# Patient Record
Sex: FEMALE | Race: WHITE | NOT HISPANIC OR LATINO | Employment: OTHER | URBAN - METROPOLITAN AREA
[De-identification: names, ages, dates, MRNs, and addresses within clinical notes are randomized per-mention and may not be internally consistent; named-entity substitution may affect disease eponyms.]

---

## 2018-12-21 PROBLEM — E03.9 HYPOTHYROIDISM: Status: ACTIVE | Noted: 2018-12-21

## 2018-12-21 PROBLEM — I48.91 ATRIAL FIBRILLATION WITH RVR: Status: ACTIVE | Noted: 2018-12-21

## 2018-12-21 PROBLEM — I10 PRIMARY HYPERTENSION: Status: ACTIVE | Noted: 2018-12-21

## 2018-12-21 PROBLEM — K21.9 GASTROESOPHAGEAL REFLUX DISEASE WITHOUT ESOPHAGITIS: Status: ACTIVE | Noted: 2018-12-21

## 2019-01-11 PROBLEM — C50.412 CARCINOMA OF BREAST UPPER OUTER QUADRANT, LEFT: Status: ACTIVE | Noted: 2019-01-11

## 2019-03-06 PROBLEM — K80.10 CALCULUS OF GALLBLADDER WITH CHRONIC CHOLECYSTITIS WITHOUT OBSTRUCTION: Status: ACTIVE | Noted: 2019-03-06

## 2019-04-23 PROBLEM — C50.412 MALIGNANT NEOPLASM OF UPPER-OUTER QUADRANT OF LEFT FEMALE BREAST: Status: ACTIVE | Noted: 2019-04-23

## 2019-06-04 ENCOUNTER — CLINICAL SUPPORT (OUTPATIENT)
Dept: REHABILITATION | Facility: HOSPITAL | Age: 69
End: 2019-06-04
Payer: MEDICARE

## 2019-06-04 DIAGNOSIS — M25.511 ACUTE PAIN OF BOTH SHOULDERS: ICD-10-CM

## 2019-06-04 DIAGNOSIS — M25.619 LIMITED RANGE OF MOTION (ROM) OF SHOULDER: ICD-10-CM

## 2019-06-04 DIAGNOSIS — M25.512 ACUTE PAIN OF BOTH SHOULDERS: ICD-10-CM

## 2019-06-04 DIAGNOSIS — Z91.89 AT RISK FOR LYMPHEDEMA: ICD-10-CM

## 2019-06-04 PROCEDURE — 97140 MANUAL THERAPY 1/> REGIONS: CPT | Mod: PN | Performed by: PHYSICAL THERAPIST

## 2019-06-04 PROCEDURE — 97161 PT EVAL LOW COMPLEX 20 MIN: CPT | Mod: PN | Performed by: PHYSICAL THERAPIST

## 2019-06-04 NOTE — PATIENT INSTRUCTIONS
[facility]  Created by dora elkins Jan 23rd, 2018      Total 3      Wall Wax Off    Stand facing a wall, elbows bent and tucked in to your side. With the band looped around your hands, place hands flat against the wall. Keep shoulders down and back and the shoulder blades engaged.    Keeping your elbows tucked into your side, move Right hand in a clockwise Native and return to center, repteat the same motion for the Left side.  Repeat 20 Times     Complete 1 Set     Perform 2 Time(s) a Day         copyright

## 2019-06-04 NOTE — PROGRESS NOTES
[facility]  Physical Therapy Initial Evaluation    Name: Darshana Lopez  Clinic Number: [id number]    Therapy Diagnosis:   Encounter Diagnoses   Name Primary?   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  T1c(1.6cm), T2(2.4cm)N1(1/5)M0       History   History of Present Illness: Darshana is a 69 y.o. female that presents to  [facility] s/p L breast surgery. Pt underwent B mastectomy on 4/23/19 with Dr. Davidson. Pt will have radiation treatment in upcoming future. Pt reports decreased ROM in the L/R UE. 5 llnodes removed on L. Pt has had gallbladder surgery on 3/6/19 so she is fatigued due to multiple surgeries. Reports chest wall anteriorly is healed but feels very tight with UE elevation. Pt does not plan on reconstruction in future.   Chief complaint: decreased ROM L UE  Surgical History:  Darshana Lopez  has a past surgical history that includes [place] lymph node biopsy (Left, 1/11/2019); Breast lumpectomy (Left); Laparoscopic cholecystectomy (N/A, 3/6/2019); Simple mastectomy (Bilateral, 4/23/2019); and Axillary node dissection (Left, 4/23/2019).    Chemotherapy: none  Radiation: in near future     Past Medical History:   Diagnosis Date    Anticoagulant long-term use     Arthritis     Atrial fibrillation     Breast cancer     left    Cancer     Cholecystitis     Claustrophobia      Edema of extremities     GERD (gastroesophageal reflux disease)     History of bronchitis     Hypertension     Obesity, morbid, BMI 50 or higher     PONV (postoperative nausea and vomiting) 01/2011    lumpectomy and node bx (did not get sick with [facility]cyst[facility])    Thyroid disease     Vertigo           Medications:  Darshana has a current medication list which includes the following prescription(s): apixaban, benazepril, bumetanide, dicyclomine, hydrocodone-acetaminophen, levothyroxine, magnesium oxide, meclizine, pantoprazole, potassium chloride, and sotalol.    Allergies:  Review of patient's allergies indicates:   Allergen Reactions    Adhesive Other (See Comments)     Pulls skin, redness.    Dilaudid [hydromorphone (bulk)] Nausea And Vomiting    Iodine and iodide containing products Rash        Prior Therapy: none  Social History: pt lives with their spouse  Occupation: retired cosmotologist , had some shoulder limitation as well as cervical ROM loss  Prior Level of Function: I with ADL, difficulty with overhead reaching due to OA of shoulders, decreased neck ROM  Current Level of Function: pt reji to use her arms for light adl    Patient's Goals: be able to use my arms again    Hand dominance: r    Subjective   Pt states: she is in pain in the L chest wall mainly tightness   Pain: 6/10 on VAS currently.   Best: 4/10  Worst: 6/10   Pain location: left UE   Objective   Mental status :oriented    Postural examination/scapula alignment: Rounded shoulder, forward head,     Skin Integrity:   Scar Location: B chest wall sternum to axilla L/R  Appearance: dry, healed, no redness  Signs of infection: No    Edema:see measurements below  Location: L UE    Axillary Web Syndrome/Cording: none    Sensation: WNL B UEs        Range of Motion:      Shoulder Range of Motion:   Active /Passive ROM Right Left   Flexion 135 135   Abduction 135 135   Extension 10 10   IR/90deg 70 70   ER/90deg 45 60         "  Strength: manual muscle test grades below   Upper Extremity Strength   (R) UE (L) UE   Shoulder flexion: 2+/5 2+/5   Shoulder Abduction: 2+/5 2+/5   Shoulder IR 3/5 3/5   Shoulder ER 3/5 3/5   Elbow flexion: 3/5 3/5   Elbow extension: 3/5 3/5   Wrist flexion: 3+/5 3+/5   Wrist extension: 3+/5 3+/5     Baseline Measurements of BL UE's for early detection of Lymphedema:     LANDMARK RIGHT UE LEFT UE DIFFERENCE   E + 8" 50 cm 49 cm 1 cm   E + 6" 49 cm 47 cm 2 cm   E + 4" 47 cm 46 cm 1 cm   E + 2" 41 cm 44 cm 1 cm   Elbow 36 cm 39 cm 3 cm   W+ 8" 36 cm 37.5 cm .5 cm   W +  6" 34 cm 35.5 cm 1.5 cm   W + 4" 32 cm 31 cm 1 cm   Wrist 18.5 cm 19.5 cm 1 cm   DPC 21   cm 21 cm 0 cm   IP Thumb 8 cm 7.5 cm .5 cm     Functional Mobility (Bed mobility, transfers)  I    ADL's:  I with light ADL    Gait Assessment:   - AD used: none  - Assistance: independent  - Distance: community distances     Patient Education Provided   - role of therapy in multi - disciplinary team, goals for therapy  - Pt was educated in lymphedema etiology and management plans.    - Pt was provided with written risk reductions and precautions for managing lymphedema.     Pt has no cultural, educational or language barriers to learning provided.  Treatment and Instruction of Home Exercise Program      Darshana received the following manual lymphatic massage to cervical terminus, R axilla, trunk corresponding anastamoses and L UE to promote lymphatic drainage, myofascial stretching  To L chest wall were performed to increased myofascial/soft tissue length, mobility and pliability, increase PROM, AROM of L UE and function as well as to decrease pain for 20 minutes. 4/10 pain level after treatment    Written Home Exercises Provided and Patient Education: Handouts given   See EMR under patient instructions for program given  Pt demonstrated good understanding of the education provided. Patient demo good return demo of skill of exercises.    Functional Limitations " Reporting        Assessment   This is a 69 y.o. female referred to outpatient physical therapy and presents with a medical diagnosis of B mastectomy/ breast cancer and was seen today post-operatively to establish PT plan of care for impairments following surgery including: MLD, ROM manual therapy lymphedema education and risk reduction practices, HEP and therapeutic exercise.     Pt instructed in HEP this session and was able to perform all exercises given independently. Pt instructed to follow up with therapist if any concerns arise with program established. Pt will continue to benefit from skilled physical therapy to address the impairments stated in chart below, provide patient/family education and to maximize pt's level of independence in home and community environment     Anticipated barriers to physical therapy: none     Pt's spiritual, cultural and educational needs considered and pt agreeable to plan of care and goals as stated below:     Medical necessity is demonstrated by the following IMPAIRMENTS/PROMBLEM LIST:    History  Co-morbidities and personal factors that may impact the plan of care Co-morbidities:   prior abdominal surgery    Personal Factors:   no deficits     low   Examination  Body Structures and Functions, activity limitations and participation restrictions that may impact the plan of care Body Regions:   upper extremities    Body Systems:    ROM    Participation Restrictions:   B mastectomy    Activity limitations:   Learning and applying knowledge  no deficits    General Tasks and Commands  no deficits    Communication  no deficits    Mobility  lifting and carrying objects    Self care  dressing    Domestic Life  doing house work (cleaning house, washing dishes, laundry)    Interactions/Relationships  no deficits    Life Areas  no deficits    Community and Social Life  community life         low   Clinical Presentation stable and uncomplicated low   Decision Making/ Complexity Score: low        Goals: Pt agrees with goals set    Short Term goals: 4 weeks  1. Patient will demonstrate 100% understanding of lymphedema risk reduction practices to include self monitoring for lymphedema. (progressing, not met)  2. Patient will demonstrate independence with Home Exercise program established. (progressing, not met)  3. Pt will increase AROM/PROM in shoulder abduction ROM to 145 degrees bilaterally to improve functional reach, carry, push, pull pain free. (progressing, not met)  4. Pt will increase AROM/PROM in shoulder flexion to 145 degrees bilaterally to improve functional reach, carry, push, pull pain free.(progressing, not met)  5. Strength will be assessed and appropriate goals set. (progressing, not met)    Long Term Goals: 8 weeks   1.  Pt will increase AROM/PROM in shoulder flexion to 160 degrees bilaterally to improve functional reach, carry, push, pull pain free. (progressing, not met)  2. Pt will increase strength to 4+/5 in gross UE musculature to improve tolerance to all functional activities pain free. (progressing, not met)  3. Pt will demonstrate full/maximized tissue mobility to increase ROM and promote healthy tissue to be pain free at discharge. (progressing, not met)  4. Pt will report decrease in overall worst pain to 2/10 at discharge. (progressing, not met)  5. Pt will increase AROM/PROM in shoulder abduction ROM to 160 degrees bilaterally to improve functional reach, carry, push, pull pain free. (progressing, not met)  6. Patient will report compliance with walking program 5x week for 10 min each day to improve overall cardiovascular function and decrease cancer related fatigue at discharge. (progressing, not met)      Plan   Outpatient physical therapy 2x week for 8 weeks to include the following:   Manual Therapy., MLD, therapeutic ex, HEP. May discontinue in deference to radiation therapy, at Dr. Jain discretion      Therapist: Clementine Gregorio, PT , CLT  6/4/2019

## 2019-06-05 PROBLEM — M25.511 ACUTE PAIN OF BOTH SHOULDERS: Status: ACTIVE | Noted: 2019-06-05

## 2019-06-05 PROBLEM — Z91.89 AT RISK FOR LYMPHEDEMA: Status: ACTIVE | Noted: 2019-06-05

## 2019-06-05 PROBLEM — M25.512 ACUTE PAIN OF BOTH SHOULDERS: Status: ACTIVE | Noted: 2019-06-05

## 2019-06-05 PROBLEM — M25.619 LIMITED RANGE OF MOTION (ROM) OF SHOULDER: Status: ACTIVE | Noted: 2019-06-05

## 2019-06-07 ENCOUNTER — CLINICAL SUPPORT (OUTPATIENT)
Dept: REHABILITATION | Facility: HOSPITAL | Age: 69
End: 2019-06-07
Payer: MEDICARE

## 2019-06-07 DIAGNOSIS — Z91.89 AT RISK FOR LYMPHEDEMA: ICD-10-CM

## 2019-06-07 DIAGNOSIS — M25.619 LIMITED RANGE OF MOTION (ROM) OF SHOULDER: ICD-10-CM

## 2019-06-07 DIAGNOSIS — M25.512 ACUTE PAIN OF BOTH SHOULDERS: ICD-10-CM

## 2019-06-07 DIAGNOSIS — M25.511 ACUTE PAIN OF BOTH SHOULDERS: ICD-10-CM

## 2019-06-07 PROCEDURE — 97140 MANUAL THERAPY 1/> REGIONS: CPT | Mod: PN | Performed by: PHYSICAL THERAPIST

## 2019-06-07 PROCEDURE — 97110 THERAPEUTIC EXERCISES: CPT | Mod: PN | Performed by: PHYSICAL THERAPIST

## 2019-06-07 NOTE — PATIENT INSTRUCTIONS
Exaggerated Breathing and Relaxation      Practice deep breathing frequently in the first few days following surgery even before you begin exercising your arm. Inhale slowly and deeply through the nose and exhale through pursed lips. Concentrate on relaxing as you let the air out of your lungs. Repeat three (3) to four (4) times, remembering to breath in deeply and then relaxing. This exercise helps to ease the sensation of pulling and discomfort that may be experienced while exercising.      Ball Squeeze    Perform this exercise three (3) times a day for three (3) to four (4) minutes each time.    The ball squeeze helps to prevent or reduce temporary swelling that may occur in the affected arm. This exercise may be performed standing, sitting or while lying in bed. During this exercise the affected arm should be slightly bent and held upward. Support your arm with a pillow if you are uncomfortable holding it up.    a. Hold a rubber ball in your hand on the affected side and lift your arm upward.  b. Alternate squeezing and relaxing the ball.          Wall Climb    Perform this exercise three (2) times a day with ten (10) repetitions.    Stand and FACE THE WALL with your toes 10 to 12 inches away from the wall.    a. Place the fingers of your affected arm on the wall and slowly walk your fingers up the wall. Let your fingers climb the wall as high as possible without feeling pulling or pain.  b. Hold this stretch for 15 to 20 seconds then move your fingers back down the wall.  c. Try to go a little higher each time. It may relax you a bit if you rest your head against the wall.    Repeat the above exercises standing with your side to the wall.        Thoracic Standing Vertebral Stretch    With feet shoulder width apart, extend arms overhead as high as possible. Stretch right arm further than left, then left arm further than right. Elongate spine and move shoulder blades further.  Repeat as tolerated. Do 1-2  times a day.         Lumbar Rotation    Put your arms out to the side - 90 degrees - your arms should be in a T formation.   Feet on floor, slowly rock knees from side to side in small, pain-free range of motion. Allow lower back to rotate slightly, but keep shoulders flat on ground. Hold 15 seconds. Repeat each side. Repeat 5 times per side. Do 1-2 sessions per day.        Gentle Lower trunk rotation with butterfly stretch - only bring legs 1/2 the distance of the picture   3 x 10 reps each side

## 2019-06-07 NOTE — PROGRESS NOTES
"                                                    Physical Therapy Daily Note     Name: Darshana Lopez  Clinic Number: [id number]  Diagnosis:   Encounter Diagnoses   Name Primary?    Limited range of motion (ROM) of shoulder     Acute pain of both shoulders     At risk for lymphedema      Physician: Shelley Fontenot *  Precautions: lymphedema  Visit #: 2 of 12  PTA Visit #: 0  Time In: 12:15(pt in traffic)  Time Out: 1pm  Total Treatment Time 1:1: 45 min    Evaluation Date: 6/4/2019  Authorization Period Expiration: 5/29/19-12/31/19  Plan of Care Certification Period: 8/4/19  Visit # / Visits authorized: 2/ 12  MD referral: yes    Subjective     Pt reports: she tolerated the last visit well, "I felt so much better after the last visit, able to move my arms better and less neck pain as well". Pt reports she has redness under B lateral axillary folds, has been placing antifungal cream on and it is improving. Has placed a call into Dr. Davidson's office.   Pain Scale: Darshana rates pain on a scale of 0-10 to be 3 currently.    Objective   Redness noted locally at B lateral axillary folds   Darshana received individual therapeutic exercises to develop ROM for 15 minutes including:(pt instructed to perform all exercises with respect to stretching only until gentle pull is felt, avoid fatigue in L UE)  phisioball rollout ex 2 x15 reps for gentle stretch into thoracic extension/UE elevation  Wall circles 2 x 15 reps with towels    Darshana received the following manual therapy techniques: Manual Lymphatic Drainage to cervical terminus/ R axilla, trunk , corresponding anastamoses and L UE for 20 minutes to promote lymphatic flow. Pt received myofascial stretching to anterior chest wall L/R as well as cervical pvms secondary to pain/tightness, scar mobility performed to anterior chest incisions, performed gently.     Written Home Exercises Provided: yes  Pt demo good understanding of the education provided. Darshana demonstrated " good return demonstration of activities.     Education provided re: report to ER if redness persists or spreads   Darshana verbalized good understanding of education provided.   No spiritual or educational barriers to learning provided    Assessment     Patient tolerated treatment well , outlined and demonstrated HEP to pt with instructions to perform until gentle stretch is felt as well as pay attention to fatigue in B UEs. Good progress with treatment today wiith decreasing pain levels  This is a 69 y.o. female referred to outpatient physical therapy and presents with a medical diagnosis of B UEs  and demonstrates limitations as described in the problem list. Pt prognosis is Good. Pt will continue to benefit from skilled outpatient physical therapy to address the deficits listed in the problem list, provide pt/family education and to maximize pt's level of independence in the home and community environment.     Goals as follows:  Short Term goals: 4 weeks  1. Patient will demonstrate 100% understanding of lymphedema risk reduction practices to include self monitoring for lymphedema. (progressing, not met)  2. Patient will demonstrate independence with Home Exercise program established. (progressing, not met)  3. Pt will increase AROM/PROM in shoulder abduction ROM to 145 degrees bilaterally to improve functional reach, carry, push, pull pain free. (progressing, not met)  4. Pt will increase AROM/PROM in shoulder flexion to 145 degrees bilaterally to improve functional reach, carry, push, pull pain free.(progressing, not met)  5. Strength will be assessed and appropriate goals set. (progressing, not met)     Long Term Goals: 8 weeks   1.  Pt will increase AROM/PROM in shoulder flexion to 160 degrees bilaterally to improve functional reach, carry, push, pull pain free. (progressing, not met)  2. Pt will increase strength to 4+/5 in gross UE musculature to improve tolerance to all functional activities pain free.  (progressing, not met)  3. Pt will demonstrate full/maximized tissue mobility to increase ROM and promote healthy tissue to be pain free at discharge. (progressing, not met)  4. Pt will report decrease in overall worst pain to 2/10 at discharge. (progressing, not met)  5. Pt will increase AROM/PROM in shoulder abduction ROM to 160 degrees bilaterally to improve functional reach, carry, push, pull pain free. (progressing, not met)  6. Patient will report compliance with walking program 5x week for 10 min each day to improve overall cardiovascular function and decrease cancer related fatigue at discharge. (progressing, not met)     Plan     Continue with established Plan of Care towards PT goals.    Clementine Gregorio, PT  6/7/2019

## 2019-06-10 ENCOUNTER — CLINICAL SUPPORT (OUTPATIENT)
Dept: REHABILITATION | Facility: HOSPITAL | Age: 69
End: 2019-06-10
Payer: MEDICARE

## 2019-06-10 DIAGNOSIS — M25.512 ACUTE PAIN OF BOTH SHOULDERS: ICD-10-CM

## 2019-06-10 DIAGNOSIS — M25.619 LIMITED RANGE OF MOTION (ROM) OF SHOULDER: ICD-10-CM

## 2019-06-10 DIAGNOSIS — Z91.89 AT RISK FOR LYMPHEDEMA: ICD-10-CM

## 2019-06-10 DIAGNOSIS — M25.511 ACUTE PAIN OF BOTH SHOULDERS: ICD-10-CM

## 2019-06-10 PROCEDURE — 97110 THERAPEUTIC EXERCISES: CPT | Mod: PN | Performed by: PHYSICAL THERAPIST

## 2019-06-10 PROCEDURE — 97140 MANUAL THERAPY 1/> REGIONS: CPT | Mod: PN | Performed by: PHYSICAL THERAPIST

## 2019-06-10 NOTE — PROGRESS NOTES
"                                                    Physical Therapy Daily Note     Name: Darshana Lopez  Clinic Number: [id number]  Diagnosis:   Encounter Diagnoses   Name Primary?    Limited range of motion (ROM) of shoulder     Acute pain of both shoulders     At risk for lymphedema      Physician: Shelley Fontenot *  Precautions: lymphedema  Visit #: 3 of 12  PTA Visit #: 0  Time In: 11:12(pt in traffic)  Time Out: 12pm  Total Treatment Time 1:1: 45 min    Evaluation Date: 6/4/2019  Authorization Period Expiration: 5/29/19-12/31/19  Plan of Care Certification Period: 8/4/19  Visit # / Visits authorized: 3/ 12  MD referral: yes    Subjective     Pt reports: she is able to fix her hair now and use the L UE more. I haven't had nearly as much pain since I started therapy.". + radicular sxs lateral 3 fingers on the L. "I hope my arm is going up enough for radiation treatments". Pt stated she wants to discuss radiation schedule with Dr. Jain before scheduling additional appointments for next week.     Pain Scale: Darshana rates pain on a scale of 0-10 to be 3 currently.    Objective   Redness noted locally at B lateral axillary folds pt reports she is going to go by Dr. Davidson's office after visit today secondary to skin irritation especially under the R axillary fold.   Darshana received individual therapeutic exercises to develop ROM for 15 minutes including:(pt instructed to perform all exercises with respect to stretching only until gentle pull is felt, avoid fatigue in L UE)  phisioball rollout ex 2 x15 reps for gentle stretch into thoracic extension/UE elevation  Wall circles 2 x 15 reps with towels  Wall slides to tolerance B x 15 reps with towels    Darshana received the following manual therapy techniques: Manual Lymphatic Drainage to cervical terminus/ R axilla, trunk , corresponding anastamoses and L UE for 30 minutes to promote lymphatic flow. Pt received myofascial stretching to anterior chest wall L/R as " well as cervical pvms secondary to pain/tightness, scar mobility performed to anterior chest incisions, performed gently.     Shoulder Range of Motion:   Active /Passive ROM Right Left   Flexion 150 145   Abduction 150 145   Extension 10 10   IR/90deg 70 70   ER/90deg 50 60      Written Home Exercises Provided: yes  Pt demo good understanding of the education provided. Darshana demonstrated good return demonstration of activities.     Education provided re: report to ER if redness persists or spreads   Darshana verbalized good understanding of education provided.   No spiritual or educational barriers to learning provided    Assessment     Patient tolerated treatment well  Improved functional use in B UEs, decreased pain B UEs and improved ROM B UEs  This is a 69 y.o. female referred to outpatient physical therapy and presents with a medical diagnosis of B UEs  and demonstrates limitations as described in the problem list. Pt prognosis is Good. Pt will continue to benefit from skilled outpatient physical therapy to address the deficits listed in the problem list, provide pt/family education and to maximize pt's level of independence in the home and community environment.     Goals as follows:  Short Term goals: 4 weeks  1. Patient will demonstrate 100% understanding of lymphedema risk reduction practices to include self monitoring for lymphedema. (progressing, not met)  2. Patient will demonstrate independence with Home Exercise program established. (progressing, not met)  3. Pt will increase AROM/PROM in shoulder abduction ROM to 145 degrees bilaterally to improve functional reach, carry, push, pull pain free. (progressing, not met)  4. Pt will increase AROM/PROM in shoulder flexion to 145 degrees bilaterally to improve functional reach, carry, push, pull pain free.(progressing, not met)  5. Strength will be assessed and appropriate goals set. (progressing, not met)     Long Term Goals: 8 weeks   1.  Pt will increase  AROM/PROM in shoulder flexion to 160 degrees bilaterally to improve functional reach, carry, push, pull pain free. (progressing, not met)  2. Pt will increase strength to 4+/5 in gross UE musculature to improve tolerance to all functional activities pain free. (progressing, not met)  3. Pt will demonstrate full/maximized tissue mobility to increase ROM and promote healthy tissue to be pain free at discharge. (progressing, not met)  4. Pt will report decrease in overall worst pain to 2/10 at discharge. (progressing, not met)  5. Pt will increase AROM/PROM in shoulder abduction ROM to 160 degrees bilaterally to improve functional reach, carry, push, pull pain free. (progressing, not met)  6. Patient will report compliance with walking program 5x week for 10 min each day to improve overall cardiovascular function and decrease cancer related fatigue at discharge. (progressing, not met)     Plan     Continue with established Plan of Care towards PT goals.    Clementine Gregorio, PT  6/10/2019

## 2019-06-12 ENCOUNTER — CLINICAL SUPPORT (OUTPATIENT)
Dept: REHABILITATION | Facility: HOSPITAL | Age: 69
End: 2019-06-12
Payer: MEDICARE

## 2019-06-12 DIAGNOSIS — M25.511 ACUTE PAIN OF BOTH SHOULDERS: ICD-10-CM

## 2019-06-12 DIAGNOSIS — M25.619 LIMITED RANGE OF MOTION (ROM) OF SHOULDER: ICD-10-CM

## 2019-06-12 DIAGNOSIS — Z91.89 AT RISK FOR LYMPHEDEMA: ICD-10-CM

## 2019-06-12 DIAGNOSIS — M25.512 ACUTE PAIN OF BOTH SHOULDERS: ICD-10-CM

## 2019-06-12 PROCEDURE — 97110 THERAPEUTIC EXERCISES: CPT | Mod: PN

## 2019-06-12 PROCEDURE — 97140 MANUAL THERAPY 1/> REGIONS: CPT | Mod: PN

## 2019-06-12 NOTE — PROGRESS NOTES
Physical Therapy Daily Note     Name: Darshana Lopez  Clinic Number: [id number]  Diagnosis:   Encounter Diagnoses   Name Primary?    Limited range of motion (ROM) of shoulder     Acute pain of both shoulders     At risk for lymphedema      Physician: Shelley Fontenot *  Precautions: lymphedema  Visit #: 4 of 12  PTA Visit #: 0  Time In: 11:07  Time Out: 12:05pm  Total Treatment Time 1:1: 55 min    Evaluation Date: 6/4/2019  Authorization Period Expiration: 5/29/19-12/31/19  Plan of Care Certification Period: 8/4/19  Visit # / Visits authorized: 4/ 12  MD referral: yes    Subjective     Pt reports: she has seen great results with therapy with increased range of motion. She is interested in continuing with therapy while undergoing radiation.     Pain Scale: Darshana rates pain on a scale of 0-10 to be 2 currently.    Objective   Redness noted locally at B lateral axillary folds which patient reports has improved and she is using fungal cream.    Darshana received individual therapeutic exercises to develop ROM for 15 minutes including:(pt instructed to perform all exercises with respect to stretching only until gentle pull is felt, avoid fatigue in L UE)  phisioball rollout ex 2 x15 reps for gentle stretch into thoracic extension/UE elevation  Wall circles 2 x 15 reps with towels  Wall slides to tolerance B x 15 reps with towels  Supine cervical retraction with thick towel roll and and pillow     Darshana received the following manual therapy techniques: Pt received myofascial stretching to anterior chest wall L/R as well as cervical pvms secondary to pain/tightness, scar mobility performed to anterior chest incisions, performed gently pec muscle bending B. The above performed for a total of 40 minutes.     Written Home Exercises Provided: cervical retraction.   Pt demo good understanding of the education provided. Darshana demonstrated good return demonstration of  activities.     Education provided re: report to ER if redness persists or spreads   Darshana verbalized good understanding of education provided.   No spiritual or educational barriers to learning provided    Assessment     Patient presents with improved soft tissue mobility post treatment. She presents with increased tightness to cervical extensors which improve with manual techniques and gentle cervical retraction.   This is a 69 y.o. female referred to outpatient physical therapy and presents with a medical diagnosis of B UEs  and demonstrates limitations as described in the problem list. Pt prognosis is Good. Pt will continue to benefit from skilled outpatient physical therapy to address the deficits listed in the problem list, provide pt/family education and to maximize pt's level of independence in the home and community environment.     Goals as follows:  Short Term goals: 4 weeks  1. Patient will demonstrate 100% understanding of lymphedema risk reduction practices to include self monitoring for lymphedema. (progressing, not met)  2. Patient will demonstrate independence with Home Exercise program established. (progressing, not met)  3. Pt will increase AROM/PROM in shoulder abduction ROM to 145 degrees bilaterally to improve functional reach, carry, push, pull pain free. (progressing, not met)  4. Pt will increase AROM/PROM in shoulder flexion to 145 degrees bilaterally to improve functional reach, carry, push, pull pain free.(progressing, not met)  5. Strength will be assessed and appropriate goals set. (progressing, not met)     Long Term Goals: 8 weeks   1.  Pt will increase AROM/PROM in shoulder flexion to 160 degrees bilaterally to improve functional reach, carry, push, pull pain free. (progressing, not met)  2. Pt will increase strength to 4+/5 in gross UE musculature to improve tolerance to all functional activities pain free. (progressing, not met)  3. Pt will demonstrate full/maximized tissue mobility  to increase ROM and promote healthy tissue to be pain free at discharge. (progressing, not met)  4. Pt will report decrease in overall worst pain to 2/10 at discharge. (progressing, not met)  5. Pt will increase AROM/PROM in shoulder abduction ROM to 160 degrees bilaterally to improve functional reach, carry, push, pull pain free. (progressing, not met)  6. Patient will report compliance with walking program 5x week for 10 min each day to improve overall cardiovascular function and decrease cancer related fatigue at discharge. (progressing, not met)     Plan     Continue with established Plan of Care towards PT goals.    Selvin Salazar, PT  6/12/2019

## 2019-06-14 ENCOUNTER — CLINICAL SUPPORT (OUTPATIENT)
Dept: REHABILITATION | Facility: HOSPITAL | Age: 69
End: 2019-06-14
Payer: MEDICARE

## 2019-06-14 DIAGNOSIS — M25.619 LIMITED RANGE OF MOTION (ROM) OF SHOULDER: ICD-10-CM

## 2019-06-14 DIAGNOSIS — Z91.89 AT RISK FOR LYMPHEDEMA: ICD-10-CM

## 2019-06-14 DIAGNOSIS — M25.511 ACUTE PAIN OF BOTH SHOULDERS: ICD-10-CM

## 2019-06-14 DIAGNOSIS — M25.512 ACUTE PAIN OF BOTH SHOULDERS: ICD-10-CM

## 2019-06-14 PROCEDURE — 97110 THERAPEUTIC EXERCISES: CPT | Mod: PN | Performed by: PHYSICAL THERAPIST

## 2019-06-14 PROCEDURE — 97140 MANUAL THERAPY 1/> REGIONS: CPT | Mod: PN | Performed by: PHYSICAL THERAPIST

## 2019-06-14 NOTE — PROGRESS NOTES
Physical Therapy Daily Note     Name: Darshana Lopez  Clinic Number: [id number]  Diagnosis:   Encounter Diagnoses   Name Primary?    Limited range of motion (ROM) of shoulder     Acute pain of both shoulders     At risk for lymphedema      Physician: Shelley Fontenot *  Precautions: lymphedema  Visit #: 5 of 12  PTA Visit #: 0  Time In: 9:00 am  Time Out: 10 am  Total Treatment Time 1:1: 55 min    Evaluation Date: 6/4/2019  Authorization Period Expiration: 5/29/19-12/31/19  Plan of Care Certification Period: 8/4/19  Visit # / Visits authorized: 5/ 12  MD referral: yes    Subjective     Pt reports: she is doing much better since beginning therapy, I can reach up so much better over my head. Sees Dr. Davidson next week for f/u. Still has reddened area under the axillary folds. Dr. Davidson's office called in a cream but doesn't seem to be working yet per pt.  Saw her cardiologist and received a great report. Still having trouble with her stomach , starting a new med . Will see Dr. Jain next week to begin radiation soon. Pt will get clarification from him regarding attending therapy or placing therapy on hold.     Pain Scale: Darshana rates pain on a scale of 0-10 to be 4 currently.under the L axilla/chest wall    Objective   Redness noted locally at B lateral axillary folds which patient reports has improved and she is using fungal cream.    Darshana received individual therapeutic exercises to develop ROM for 15 minutes including:(pt instructed to perform all exercises with respect to stretching only until gentle pull is felt, avoid fatigue in L UE)  phisioball rollout ex 2 x15 reps for gentle stretch into thoracic extension/UE elevation  Wall circles 2 x 15 reps with towels  Wall slides to tolerance B x 15 reps with towels  Supine cervical retraction with thick towel roll and and pillow \  Pec major stretch in doorway 3 sec hold to 90 degrees elevation(gentle  stretch cueing) x 5 reps L/R    Darshana received the following manual therapy techniques: Pt received myofascial stretching to anterior chest wall L/R as well as cervical pvms secondary to pain/tightness, scar mobility performed to anterior chest incisions, performed gently pec muscle stretching via towel roll under pt's head and had pt retract her shoulders toward the mat x 5 reps. The above performed for a total of 45 minutes.     Written Home Exercises Provided: cervical retraction with shoulder retraction towel roll under her head systematically lowering her head , added pec major stretch in doorway with trunk rotation to opposite side, handout provided, pt encouraged to do gentle stretch only.   Pt demo good understanding of the education provided. Darshana demonstrated good return demonstration of activities.     Education provided re: report to ER if redness persists or spreads   Darshana verbalized good understanding of education provided.   No spiritual or educational barriers to learning provided    Assessment     Patient presents with good functional mobility in her UEs, able to reach up into cabinets more easily with B UEs, pt to call if she is to continue after f/u with Dr. Jain. Added pec stretch today to improve ROM, encouraged lowering of head as tolerated due to cervical extensor tightness.   This is a 69 y.o. female referred to outpatient physical therapy and presents with a medical diagnosis of B UEs  and demonstrates limitations as described in the problem list. Pt prognosis is Good. Pt will continue to benefit from skilled outpatient physical therapy to address the deficits listed in the problem list, provide pt/family education and to maximize pt's level of independence in the home and community environment.     Goals as follows:  Short Term goals: 4 weeks  1. Patient will demonstrate 100% understanding of lymphedema risk reduction practices to include self monitoring for lymphedema. (progressing, not  met)  2. Patient will demonstrate independence with Home Exercise program established. (progressing, not met)  3. Pt will increase AROM/PROM in shoulder abduction ROM to 145 degrees bilaterally to improve functional reach, carry, push, pull pain free. (progressing, not met)  4. Pt will increase AROM/PROM in shoulder flexion to 145 degrees bilaterally to improve functional reach, carry, push, pull pain free.(progressing, not met)  5. Strength will be assessed and appropriate goals set. (progressing, not met)     Long Term Goals: 8 weeks   1.  Pt will increase AROM/PROM in shoulder flexion to 160 degrees bilaterally to improve functional reach, carry, push, pull pain free. (progressing, not met)  2. Pt will increase strength to 4+/5 in gross UE musculature to improve tolerance to all functional activities pain free. (progressing, not met)  3. Pt will demonstrate full/maximized tissue mobility to increase ROM and promote healthy tissue to be pain free at discharge. (progressing, not met)  4. Pt will report decrease in overall worst pain to 2/10 at discharge. (progressing, not met)  5. Pt will increase AROM/PROM in shoulder abduction ROM to 160 degrees bilaterally to improve functional reach, carry, push, pull pain free. (progressing, not met)  6. Patient will report compliance with walking program 5x week for 10 min each day to improve overall cardiovascular function and decrease cancer related fatigue at discharge. (progressing, not met)     Plan     Continue with established Plan of Care towards PT goals.    Clementine Gregorio, PT  6/14/2019

## 2019-06-29 PROBLEM — A41.9 SEVERE SEPSIS: Status: ACTIVE | Noted: 2019-06-29

## 2019-06-29 PROBLEM — J90 PLEURAL EFFUSION: Status: ACTIVE | Noted: 2019-06-29

## 2019-06-29 PROBLEM — I31.39 PERICARDIAL EFFUSION: Status: ACTIVE | Noted: 2019-06-29

## 2019-06-29 PROBLEM — R65.20 SEVERE SEPSIS: Status: ACTIVE | Noted: 2019-06-29

## 2019-06-29 PROBLEM — R79.89 ELEVATED TROPONIN: Status: ACTIVE | Noted: 2019-06-29

## 2019-06-29 PROBLEM — N17.9 AKI (ACUTE KIDNEY INJURY): Status: ACTIVE | Noted: 2019-06-29

## 2019-07-01 PROBLEM — A40.1: Status: ACTIVE | Noted: 2019-07-01

## 2019-07-12 PROBLEM — Z75.8 DISCHARGE PLANNING ISSUES: Status: ACTIVE | Noted: 2019-07-12

## 2019-07-20 PROBLEM — I48.20 CHRONIC ATRIAL FIBRILLATION: Status: ACTIVE | Noted: 2019-07-20

## 2019-07-20 PROBLEM — I50.23 ACUTE ON CHRONIC SYSTOLIC CONGESTIVE HEART FAILURE: Status: ACTIVE | Noted: 2019-07-20

## 2019-07-20 PROBLEM — R06.02 SOB (SHORTNESS OF BREATH): Status: ACTIVE | Noted: 2019-07-20

## 2019-07-20 PROBLEM — E66.9 OBESITY: Status: ACTIVE | Noted: 2019-07-20

## 2019-08-01 PROBLEM — I50.9 CHF (CONGESTIVE HEART FAILURE), NYHA CLASS III: Status: ACTIVE | Noted: 2019-08-01

## 2019-08-02 PROBLEM — G93.32 CHRONIC FATIGUE SYNDROME: Status: ACTIVE | Noted: 2019-08-02

## 2019-08-13 PROBLEM — I87.2 CHRONIC VENOUS STASIS DERMATITIS OF BOTH LOWER EXTREMITIES: Status: ACTIVE | Noted: 2019-08-13

## 2019-08-13 PROBLEM — I50.33 ACUTE ON CHRONIC DIASTOLIC HF (HEART FAILURE): Status: ACTIVE | Noted: 2019-08-13

## 2019-08-13 PROBLEM — R06.02 SHORTNESS OF BREATH: Status: ACTIVE | Noted: 2019-08-13

## 2019-08-13 PROBLEM — I48.0 PAROXYSMAL ATRIAL FIBRILLATION: Status: ACTIVE | Noted: 2019-08-13

## 2019-08-16 PROBLEM — E87.6 HYPOKALEMIA: Status: ACTIVE | Noted: 2019-08-16

## 2019-08-19 PROBLEM — J96.01 ACUTE RESPIRATORY FAILURE WITH HYPOXIA: Status: ACTIVE | Noted: 2019-08-19

## 2019-10-04 PROBLEM — E85.9 AMYLOIDOSIS: Status: ACTIVE | Noted: 2019-10-04

## 2019-10-06 ENCOUNTER — DOCUMENTATION ONLY (OUTPATIENT)
Dept: REHABILITATION | Facility: HOSPITAL | Age: 69
End: 2019-10-06

## 2019-10-07 NOTE — PROGRESS NOTES
PHYSICAL THERAPY DISCHARGE:    This patient was originally evaluated at our facility on: 6/4/19         Pt attended PT for a total of 4 Visits receiving: Manual Therapy, Therex, Postural Education, Body Mechanics Training, Home Exercise Instruction     This patient's last visit occurred on: 6/14/19      Short term goals were achieved: no    Long term goals were achieved: no    Pt was DC'd from our care secondary to: pt did not f/u       Therapist: Clementine Gregorio, PT  10/6/2019

## 2019-11-12 ENCOUNTER — TELEPHONE (OUTPATIENT)
Dept: TRANSPLANT | Facility: CLINIC | Age: 69
End: 2019-11-12

## 2019-11-12 NOTE — TELEPHONE ENCOUNTER
Called pt to get her scheduled and pt informed me that she's not ready to schedule because is starting chemo Monday. Pt said she's going to talk to her doctor and see what her doctor has to say and then call me back.

## 2019-11-13 ENCOUNTER — LAB VISIT (OUTPATIENT)
Dept: LAB | Facility: HOSPITAL | Age: 69
End: 2019-11-13
Attending: INTERNAL MEDICINE
Payer: MEDICARE

## 2019-11-13 DIAGNOSIS — I43 CARDIAC AMYLOIDOSIS: ICD-10-CM

## 2019-11-13 DIAGNOSIS — E85.4 CARDIAC AMYLOIDOSIS: ICD-10-CM

## 2019-11-13 LAB
ALBUMIN SERPL BCP-MCNC: 4.7 G/DL (ref 3.5–5.2)
ALP SERPL-CCNC: 78 U/L (ref 38–145)
ALT SERPL W/O P-5'-P-CCNC: 20 U/L (ref 10–44)
ANION GAP SERPL CALC-SCNC: 9 MMOL/L (ref 8–16)
AST SERPL-CCNC: 24 U/L (ref 14–36)
B2 MICROGLOB SERPL-MCNC: 5.1 UG/ML (ref 0–2.5)
BASOPHILS # BLD AUTO: 0.03 K/UL (ref 0–0.2)
BASOPHILS NFR BLD: 0.5 % (ref 0–1.9)
BILIRUB SERPL-MCNC: 0.5 MG/DL (ref 0.2–1.3)
BUN SERPL-MCNC: 31 MG/DL (ref 7–18)
CALCIUM SERPL-MCNC: 9.5 MG/DL (ref 8.4–10.2)
CHLORIDE SERPL-SCNC: 102 MMOL/L (ref 95–110)
CO2 SERPL-SCNC: 28 MMOL/L (ref 22–31)
CREAT SERPL-MCNC: 1.8 MG/DL (ref 0.5–1.4)
CRP SERPL-MCNC: <0.5 MG/DL (ref 0–0.9)
DIFFERENTIAL METHOD: ABNORMAL
EOSINOPHIL # BLD AUTO: 0.2 K/UL (ref 0–0.5)
EOSINOPHIL NFR BLD: 3.5 % (ref 0–8)
ERYTHROCYTE [DISTWIDTH] IN BLOOD BY AUTOMATED COUNT: 18.1 % (ref 11.5–14.5)
EST. GFR  (AFRICAN AMERICAN): 32 ML/MIN/1.73 M^2
EST. GFR  (NON AFRICAN AMERICAN): 28 ML/MIN/1.73 M^2
GLUCOSE SERPL-MCNC: 98 MG/DL (ref 70–110)
HCT VFR BLD AUTO: 40.4 % (ref 37–48.5)
HGB BLD-MCNC: 11.9 G/DL (ref 12–16)
IGA SERPL-MCNC: 418 MG/DL (ref 40–350)
IGG SERPL-MCNC: 1814 MG/DL (ref 650–1600)
IGM SERPL-MCNC: 38 MG/DL (ref 50–300)
IMM GRANULOCYTES # BLD AUTO: 0.01 K/UL (ref 0–0.04)
IMM GRANULOCYTES NFR BLD AUTO: 0.2 % (ref 0–0.5)
LDH SERPL L TO P-CCNC: 613 U/L (ref 313–618)
LYMPHOCYTES # BLD AUTO: 2.4 K/UL (ref 1–4.8)
LYMPHOCYTES NFR BLD: 39 % (ref 18–48)
MAGNESIUM SERPL-MCNC: 2.1 MG/DL (ref 1.6–2.6)
MCH RBC QN AUTO: 24.3 PG (ref 27–31)
MCHC RBC AUTO-ENTMCNC: 29.5 G/DL (ref 32–36)
MCV RBC AUTO: 82 FL (ref 82–98)
MONOCYTES # BLD AUTO: 0.4 K/UL (ref 0.3–1)
MONOCYTES NFR BLD: 6.1 % (ref 4–15)
NEUTROPHILS # BLD AUTO: 3.1 K/UL (ref 1.8–7.7)
NEUTROPHILS NFR BLD: 50.7 % (ref 38–73)
NRBC BLD-RTO: 0 /100 WBC
PLATELET # BLD AUTO: 290 K/UL (ref 150–350)
PMV BLD AUTO: 10.3 FL (ref 9.2–12.9)
POTASSIUM SERPL-SCNC: 4.1 MMOL/L (ref 3.5–5.1)
PROT SERPL-MCNC: 8.9 G/DL (ref 6–8.4)
RBC # BLD AUTO: 4.9 M/UL (ref 4–5.4)
SODIUM SERPL-SCNC: 139 MMOL/L (ref 136–145)
WBC # BLD AUTO: 6.08 K/UL (ref 3.9–12.7)

## 2019-11-13 PROCEDURE — 85025 COMPLETE CBC W/AUTO DIFF WBC: CPT

## 2019-11-13 PROCEDURE — 83615 LACTATE (LD) (LDH) ENZYME: CPT | Mod: PN

## 2019-11-13 PROCEDURE — 36415 COLL VENOUS BLD VENIPUNCTURE: CPT | Mod: PN

## 2019-11-13 PROCEDURE — 84165 PROTEIN E-PHORESIS SERUM: CPT

## 2019-11-13 PROCEDURE — 86334 PATHOLOGIST INTERPRETATION IFE: ICD-10-PCS | Mod: 26,,, | Performed by: PATHOLOGY

## 2019-11-13 PROCEDURE — 86334 IMMUNOFIX E-PHORESIS SERUM: CPT

## 2019-11-13 PROCEDURE — 80053 COMPREHEN METABOLIC PANEL: CPT | Mod: PN

## 2019-11-13 PROCEDURE — 83615 LACTATE (LD) (LDH) ENZYME: CPT

## 2019-11-13 PROCEDURE — 82232 ASSAY OF BETA-2 PROTEIN: CPT

## 2019-11-13 PROCEDURE — 83735 ASSAY OF MAGNESIUM: CPT | Mod: PN

## 2019-11-13 PROCEDURE — 83520 IMMUNOASSAY QUANT NOS NONAB: CPT

## 2019-11-13 PROCEDURE — 84165 PATHOLOGIST INTERPRETATION SPE: ICD-10-PCS | Mod: 26,,, | Performed by: PATHOLOGY

## 2019-11-13 PROCEDURE — 84165 PROTEIN E-PHORESIS SERUM: CPT | Mod: 26,,, | Performed by: PATHOLOGY

## 2019-11-13 PROCEDURE — 86140 C-REACTIVE PROTEIN: CPT

## 2019-11-13 PROCEDURE — 80053 COMPREHEN METABOLIC PANEL: CPT

## 2019-11-13 PROCEDURE — 83735 ASSAY OF MAGNESIUM: CPT

## 2019-11-13 PROCEDURE — 86140 C-REACTIVE PROTEIN: CPT | Mod: PN

## 2019-11-13 PROCEDURE — 82784 ASSAY IGA/IGD/IGG/IGM EACH: CPT | Mod: 59

## 2019-11-13 PROCEDURE — 86334 IMMUNOFIX E-PHORESIS SERUM: CPT | Mod: 26,,, | Performed by: PATHOLOGY

## 2019-11-13 PROCEDURE — 85025 COMPLETE CBC W/AUTO DIFF WBC: CPT | Mod: PN

## 2019-11-14 ENCOUNTER — DOCUMENTATION ONLY (OUTPATIENT)
Dept: TRANSPLANT | Facility: CLINIC | Age: 69
End: 2019-11-14

## 2019-11-14 LAB
ALBUMIN SERPL ELPH-MCNC: 4.38 G/DL (ref 3.35–5.55)
ALPHA1 GLOB SERPL ELPH-MCNC: 0.24 G/DL (ref 0.17–0.41)
ALPHA2 GLOB SERPL ELPH-MCNC: 0.76 G/DL (ref 0.43–0.99)
B-GLOBULIN SERPL ELPH-MCNC: 1.06 G/DL (ref 0.5–1.1)
GAMMA GLOB SERPL ELPH-MCNC: 1.56 G/DL (ref 0.67–1.58)
INTERPRETATION SERPL IFE-IMP: NORMAL
KAPPA LC SER QL IA: 5.27 MG/DL (ref 0.33–1.94)
KAPPA LC/LAMBDA SER IA: 1.35 (ref 0.26–1.65)
LAMBDA LC SER QL IA: 3.89 MG/DL (ref 0.57–2.63)
PATHOLOGIST INTERPRETATION IFE: NORMAL
PATHOLOGIST INTERPRETATION SPE: NORMAL
PROT SERPL-MCNC: 8 G/DL (ref 6–8.4)

## 2019-11-14 NOTE — NURSING
Met with Dr. Reed Noel on 10/30/19  in [place] at [facility] hosted by [employer] with Dr. Hudson as the speaker on amyloidosis. Received records from Dr. 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 Will discuss with Dr. Hudson and refer to Heart Failure. Name of patient is Darshana Lopez  1950

## 2019-11-15 ENCOUNTER — LAB VISIT (OUTPATIENT)
Dept: LAB | Facility: HOSPITAL | Age: 69
End: 2019-11-15
Attending: INTERNAL MEDICINE
Payer: MEDICARE

## 2019-11-15 DIAGNOSIS — E85.4 CARDIAC AMYLOIDOSIS: ICD-10-CM

## 2019-11-15 DIAGNOSIS — I43 CARDIAC AMYLOIDOSIS: ICD-10-CM

## 2019-11-15 PROCEDURE — 84166 PROTEIN E-PHORESIS/URINE/CSF: CPT | Mod: 26,,, | Performed by: PATHOLOGY

## 2019-11-15 PROCEDURE — 86335 IMMUNFIX E-PHORSIS/URINE/CSF: CPT

## 2019-11-15 PROCEDURE — 84166 PATHOLOGIST INTERPRETATION UPE: ICD-10-PCS | Mod: 26,,, | Performed by: PATHOLOGY

## 2019-11-15 PROCEDURE — 86335 PATHOLOGIST INTERPRETATION UIFE: ICD-10-PCS | Mod: 26,,, | Performed by: PATHOLOGY

## 2019-11-15 PROCEDURE — 84166 PROTEIN E-PHORESIS/URINE/CSF: CPT

## 2019-11-15 PROCEDURE — 84156 ASSAY OF PROTEIN URINE: CPT

## 2019-11-15 PROCEDURE — 86335 IMMUNFIX E-PHORSIS/URINE/CSF: CPT | Mod: 26,,, | Performed by: PATHOLOGY

## 2019-11-16 LAB
PROT 24H UR-MRATE: NORMAL MG/SPEC (ref 0–100)
PROT UR-MCNC: <7 MG/DL (ref 0–15)
URINE COLLECTION DURATION: 24 HR
URINE VOLUME: 2235 ML

## 2019-11-18 LAB
INTERPRETATION UR IFE-IMP: NORMAL
PATHOLOGIST INTERPRETATION UIFE: NORMAL
PATHOLOGIST INTERPRETATION UPE: NORMAL
PROT PATTERN UR ELPH-IMP: NORMAL